# Patient Record
Sex: FEMALE | Race: OTHER | NOT HISPANIC OR LATINO
[De-identification: names, ages, dates, MRNs, and addresses within clinical notes are randomized per-mention and may not be internally consistent; named-entity substitution may affect disease eponyms.]

---

## 2017-03-14 ENCOUNTER — APPOINTMENT (OUTPATIENT)
Dept: OBGYN | Facility: CLINIC | Age: 37
End: 2017-03-14

## 2017-05-10 ENCOUNTER — APPOINTMENT (OUTPATIENT)
Dept: OBGYN | Facility: CLINIC | Age: 37
End: 2017-05-10

## 2017-12-06 ENCOUNTER — APPOINTMENT (OUTPATIENT)
Dept: OBGYN | Facility: CLINIC | Age: 37
End: 2017-12-06

## 2019-02-18 ENCOUNTER — RESULT REVIEW (OUTPATIENT)
Age: 39
End: 2019-02-18

## 2019-03-24 ENCOUNTER — OUTPATIENT (OUTPATIENT)
Dept: OUTPATIENT SERVICES | Facility: HOSPITAL | Age: 39
LOS: 1 days | End: 2019-03-24
Payer: MEDICAID

## 2019-03-24 DIAGNOSIS — Z3A.00 WEEKS OF GESTATION OF PREGNANCY NOT SPECIFIED: ICD-10-CM

## 2019-03-24 DIAGNOSIS — O26.899 OTHER SPECIFIED PREGNANCY RELATED CONDITIONS, UNSPECIFIED TRIMESTER: ICD-10-CM

## 2019-03-24 PROCEDURE — G0463: CPT

## 2019-03-24 PROCEDURE — 59025 FETAL NON-STRESS TEST: CPT | Mod: 26

## 2019-03-24 PROCEDURE — 59025 FETAL NON-STRESS TEST: CPT

## 2020-07-15 ENCOUNTER — RESULT REVIEW (OUTPATIENT)
Age: 40
End: 2020-07-15

## 2021-03-29 ENCOUNTER — INPATIENT (INPATIENT)
Facility: HOSPITAL | Age: 41
LOS: 0 days | Discharge: HOME | End: 2021-03-30
Attending: OBSTETRICS & GYNECOLOGY | Admitting: OBSTETRICS & GYNECOLOGY
Payer: MEDICAID

## 2021-03-29 ENCOUNTER — TRANSCRIPTION ENCOUNTER (OUTPATIENT)
Age: 41
End: 2021-03-29

## 2021-03-29 VITALS
SYSTOLIC BLOOD PRESSURE: 119 MMHG | RESPIRATION RATE: 17 BRPM | HEART RATE: 86 BPM | TEMPERATURE: 98 F | DIASTOLIC BLOOD PRESSURE: 76 MMHG

## 2021-03-29 DIAGNOSIS — Z3A.00 WEEKS OF GESTATION OF PREGNANCY NOT SPECIFIED: ICD-10-CM

## 2021-03-29 DIAGNOSIS — O26.899 OTHER SPECIFIED PREGNANCY RELATED CONDITIONS, UNSPECIFIED TRIMESTER: ICD-10-CM

## 2021-03-29 DIAGNOSIS — Z34.80 ENCOUNTER FOR SUPERVISION OF OTHER NORMAL PREGNANCY, UNSPECIFIED TRIMESTER: ICD-10-CM

## 2021-03-29 LAB
APTT BLD: 27.1 SEC — LOW (ref 27.5–35.5)
BASOPHILS # BLD AUTO: 0.02 K/UL — SIGNIFICANT CHANGE UP (ref 0–0.2)
BASOPHILS NFR BLD AUTO: 0.2 % — SIGNIFICANT CHANGE UP (ref 0–2)
BLD GP AB SCN SERPL QL: NEGATIVE — SIGNIFICANT CHANGE UP
COVID-19 SPIKE DOMAIN AB INTERP: POSITIVE
COVID-19 SPIKE DOMAIN ANTIBODY RESULT: 41.3 U/ML — HIGH
EOSINOPHIL # BLD AUTO: 0.11 K/UL — SIGNIFICANT CHANGE UP (ref 0–0.5)
EOSINOPHIL NFR BLD AUTO: 1.3 % — SIGNIFICANT CHANGE UP (ref 0–6)
FIBRINOGEN PPP-MCNC: 623 MG/DL — HIGH (ref 290–520)
HBV SURFACE AG SERPL QL IA: SIGNIFICANT CHANGE UP
HCT VFR BLD CALC: 40.8 % — SIGNIFICANT CHANGE UP (ref 34.5–45)
HGB BLD-MCNC: 13.8 G/DL — SIGNIFICANT CHANGE UP (ref 11.5–15.5)
IMM GRANULOCYTES NFR BLD AUTO: 0.5 % — SIGNIFICANT CHANGE UP (ref 0–1.5)
INR BLD: 0.91 RATIO — SIGNIFICANT CHANGE UP (ref 0.88–1.16)
LYMPHOCYTES # BLD AUTO: 2.2 K/UL — SIGNIFICANT CHANGE UP (ref 1–3.3)
LYMPHOCYTES # BLD AUTO: 26.9 % — SIGNIFICANT CHANGE UP (ref 13–44)
MCHC RBC-ENTMCNC: 29.2 PG — SIGNIFICANT CHANGE UP (ref 27–34)
MCHC RBC-ENTMCNC: 33.8 GM/DL — SIGNIFICANT CHANGE UP (ref 32–36)
MCV RBC AUTO: 86.3 FL — SIGNIFICANT CHANGE UP (ref 80–100)
MONOCYTES # BLD AUTO: 0.64 K/UL — SIGNIFICANT CHANGE UP (ref 0–0.9)
MONOCYTES NFR BLD AUTO: 7.8 % — SIGNIFICANT CHANGE UP (ref 2–14)
NEUTROPHILS # BLD AUTO: 5.17 K/UL — SIGNIFICANT CHANGE UP (ref 1.8–7.4)
NEUTROPHILS NFR BLD AUTO: 63.3 % — SIGNIFICANT CHANGE UP (ref 43–77)
NRBC # BLD: 0 /100 WBCS — SIGNIFICANT CHANGE UP (ref 0–0)
PLATELET # BLD AUTO: 159 K/UL — SIGNIFICANT CHANGE UP (ref 150–400)
PROTHROM AB SERPL-ACNC: 11 SEC — SIGNIFICANT CHANGE UP (ref 10.6–13.6)
RBC # BLD: 4.73 M/UL — SIGNIFICANT CHANGE UP (ref 3.8–5.2)
RBC # FLD: 12.6 % — SIGNIFICANT CHANGE UP (ref 10.3–14.5)
RH IG SCN BLD-IMP: POSITIVE — SIGNIFICANT CHANGE UP
RUBV IGG SER-ACNC: 16 INDEX — SIGNIFICANT CHANGE UP
RUBV IGG SER-IMP: POSITIVE — SIGNIFICANT CHANGE UP
SARS-COV-2 IGG+IGM SERPL QL IA: 41.3 U/ML — HIGH
SARS-COV-2 IGG+IGM SERPL QL IA: POSITIVE
SARS-COV-2 RNA SPEC QL NAA+PROBE: SIGNIFICANT CHANGE UP
T PALLIDUM AB TITR SER: NEGATIVE — SIGNIFICANT CHANGE UP
WBC # BLD: 8.18 K/UL — SIGNIFICANT CHANGE UP (ref 3.8–10.5)
WBC # FLD AUTO: 8.18 K/UL — SIGNIFICANT CHANGE UP (ref 3.8–10.5)

## 2021-03-29 PROCEDURE — 59409 OBSTETRICAL CARE: CPT | Mod: U7

## 2021-03-29 RX ORDER — AER TRAVELER 0.5 G/1
1 SOLUTION RECTAL; TOPICAL EVERY 4 HOURS
Refills: 0 | Status: DISCONTINUED | OUTPATIENT
Start: 2021-03-29 | End: 2021-03-30

## 2021-03-29 RX ORDER — OXYTOCIN 10 UNIT/ML
333.33 VIAL (ML) INJECTION
Qty: 20 | Refills: 0 | Status: DISCONTINUED | OUTPATIENT
Start: 2021-03-29 | End: 2021-03-30

## 2021-03-29 RX ORDER — LANOLIN
1 OINTMENT (GRAM) TOPICAL EVERY 6 HOURS
Refills: 0 | Status: DISCONTINUED | OUTPATIENT
Start: 2021-03-29 | End: 2021-03-30

## 2021-03-29 RX ORDER — SIMETHICONE 80 MG/1
80 TABLET, CHEWABLE ORAL EVERY 4 HOURS
Refills: 0 | Status: DISCONTINUED | OUTPATIENT
Start: 2021-03-29 | End: 2021-03-30

## 2021-03-29 RX ORDER — BENZOCAINE 10 %
1 GEL (GRAM) MUCOUS MEMBRANE EVERY 6 HOURS
Refills: 0 | Status: DISCONTINUED | OUTPATIENT
Start: 2021-03-29 | End: 2021-03-30

## 2021-03-29 RX ORDER — MAGNESIUM HYDROXIDE 400 MG/1
30 TABLET, CHEWABLE ORAL
Refills: 0 | Status: DISCONTINUED | OUTPATIENT
Start: 2021-03-29 | End: 2021-03-30

## 2021-03-29 RX ORDER — SODIUM CHLORIDE 9 MG/ML
3 INJECTION INTRAMUSCULAR; INTRAVENOUS; SUBCUTANEOUS EVERY 8 HOURS
Refills: 0 | Status: DISCONTINUED | OUTPATIENT
Start: 2021-03-29 | End: 2021-03-30

## 2021-03-29 RX ORDER — KETOROLAC TROMETHAMINE 30 MG/ML
30 SYRINGE (ML) INJECTION ONCE
Refills: 0 | Status: DISCONTINUED | OUTPATIENT
Start: 2021-03-29 | End: 2021-03-29

## 2021-03-29 RX ORDER — SODIUM CHLORIDE 9 MG/ML
1000 INJECTION, SOLUTION INTRAVENOUS
Refills: 0 | Status: DISCONTINUED | OUTPATIENT
Start: 2021-03-29 | End: 2021-03-29

## 2021-03-29 RX ORDER — DIPHENHYDRAMINE HCL 50 MG
25 CAPSULE ORAL EVERY 6 HOURS
Refills: 0 | Status: DISCONTINUED | OUTPATIENT
Start: 2021-03-29 | End: 2021-03-30

## 2021-03-29 RX ORDER — IBUPROFEN 200 MG
600 TABLET ORAL EVERY 6 HOURS
Refills: 0 | Status: COMPLETED | OUTPATIENT
Start: 2021-03-29 | End: 2022-02-25

## 2021-03-29 RX ORDER — TETANUS TOXOID, REDUCED DIPHTHERIA TOXOID AND ACELLULAR PERTUSSIS VACCINE, ADSORBED 5; 2.5; 8; 8; 2.5 [IU]/.5ML; [IU]/.5ML; UG/.5ML; UG/.5ML; UG/.5ML
0.5 SUSPENSION INTRAMUSCULAR ONCE
Refills: 0 | Status: DISCONTINUED | OUTPATIENT
Start: 2021-03-29 | End: 2021-03-30

## 2021-03-29 RX ORDER — DIBUCAINE 1 %
1 OINTMENT (GRAM) RECTAL EVERY 6 HOURS
Refills: 0 | Status: DISCONTINUED | OUTPATIENT
Start: 2021-03-29 | End: 2021-03-30

## 2021-03-29 RX ORDER — IBUPROFEN 200 MG
600 TABLET ORAL EVERY 6 HOURS
Refills: 0 | Status: DISCONTINUED | OUTPATIENT
Start: 2021-03-29 | End: 2021-03-30

## 2021-03-29 RX ORDER — ACETAMINOPHEN 500 MG
2 TABLET ORAL
Qty: 0 | Refills: 0 | DISCHARGE

## 2021-03-29 RX ORDER — ACETAMINOPHEN 500 MG
1000 TABLET ORAL ONCE
Refills: 0 | Status: COMPLETED | OUTPATIENT
Start: 2021-03-29 | End: 2021-03-29

## 2021-03-29 RX ORDER — IBUPROFEN 200 MG
1 TABLET ORAL
Qty: 0 | Refills: 0 | DISCHARGE

## 2021-03-29 RX ORDER — PRAMOXINE HYDROCHLORIDE 150 MG/15G
1 AEROSOL, FOAM RECTAL EVERY 4 HOURS
Refills: 0 | Status: DISCONTINUED | OUTPATIENT
Start: 2021-03-29 | End: 2021-03-30

## 2021-03-29 RX ORDER — OXYCODONE HYDROCHLORIDE 5 MG/1
5 TABLET ORAL ONCE
Refills: 0 | Status: DISCONTINUED | OUTPATIENT
Start: 2021-03-29 | End: 2021-03-30

## 2021-03-29 RX ORDER — ACETAMINOPHEN 500 MG
975 TABLET ORAL
Refills: 0 | Status: DISCONTINUED | OUTPATIENT
Start: 2021-03-29 | End: 2021-03-30

## 2021-03-29 RX ORDER — HYDROCORTISONE 1 %
1 OINTMENT (GRAM) TOPICAL EVERY 6 HOURS
Refills: 0 | Status: DISCONTINUED | OUTPATIENT
Start: 2021-03-29 | End: 2021-03-30

## 2021-03-29 RX ORDER — OXYCODONE HYDROCHLORIDE 5 MG/1
5 TABLET ORAL
Refills: 0 | Status: DISCONTINUED | OUTPATIENT
Start: 2021-03-29 | End: 2021-03-30

## 2021-03-29 RX ORDER — CITRIC ACID/SODIUM CITRATE 300-500 MG
15 SOLUTION, ORAL ORAL EVERY 6 HOURS
Refills: 0 | Status: DISCONTINUED | OUTPATIENT
Start: 2021-03-29 | End: 2021-03-29

## 2021-03-29 RX ADMIN — Medication 30 MILLIGRAM(S): at 06:50

## 2021-03-29 RX ADMIN — Medication 975 MILLIGRAM(S): at 22:25

## 2021-03-29 RX ADMIN — Medication 600 MILLIGRAM(S): at 11:54

## 2021-03-29 RX ADMIN — Medication 600 MILLIGRAM(S): at 12:34

## 2021-03-29 RX ADMIN — Medication 975 MILLIGRAM(S): at 21:42

## 2021-03-29 RX ADMIN — Medication 1 TABLET(S): at 11:54

## 2021-03-29 RX ADMIN — Medication 400 MILLIGRAM(S): at 06:41

## 2021-03-29 RX ADMIN — Medication 975 MILLIGRAM(S): at 16:05

## 2021-03-29 NOTE — OB PROVIDER H&P - NSHPPHYSICALEXAM_GEN_ALL_CORE
Vital Signs Last 24 Hours  T(C): --  HR: 69 (03-29-21 @ 04:51) (69 - 99)  BP: 108/66 (03-29-21 @ 04:51) (108/66 - 119/76)  RR: --  SpO2: 99% (03-29-21 @ 04:51) (80% - 100%)    Gen: appears extremely uncomfortable during contractions  SVE: 10/100/+1 with bulging bag  EFM: 130bpm, mod faith, -accel, -decel  Hooper: cx q4 min

## 2021-03-29 NOTE — OB PROVIDER H&P - HISTORY OF PRESENT ILLNESS
41 year old  at 37.0 weeks presents in active labor, GBS +.    ObHx: FT  x3 (largest baby 7+ lbs); SAB x2  MedHx:  Hx obtained via assistance from patient's  and [place]  #[id number]    41 year old  at 36.4 weeks presents in active labor, GBS +. EFW approximately 5.5#. Patient reports she has GDM (does not appear to take medications). Also reports that she takes Heparin injections daily for unclear history (possibly APLS?); states she has never had clots but took heparin in her last pregnancy as well. Denies asthma or HTN disorders.     Ob: [facility]  ObHx: FT  x3 (largest baby 7+ lbs); SAB x2  MedHx: Denies  PSH: Denies  Meds: HSQ (last took yesterday evening); ASA, PNV  All: NKDA  SocHx: Denies alcohol, tobacco, drug use  FamHx: Noncontributory

## 2021-03-29 NOTE — DISCHARGE NOTE OB - CARE PROVIDER_API CALL
[facility],   [address] [place] [address] [address], [place] [address] [phone]  Phone: (   )    -  Fax: (   )    -  Follow Up Time:

## 2021-03-29 NOTE — DISCHARGE NOTE OB - CARE PLAN
Principal Discharge DX:	Vaginal delivery  Goal:	Recovery  Assessment and plan of treatment:	Make a follow-up appointment with your doctor in SIX WEEKS for a postpartum appointment. No heavy lifting or strenuous activity for six weeks. Nothing in the vagina (such as tampons, douches, intercourse or tub baths) until you see your doctor. You can take 1000mg of Tylenol and/or 600mg of Motrin every 6 hours for pain. Call your doctor with any signs and symptoms of infection such as fever, chills, nausea or vomiting; if you're unable to tolerate food, have an increase in vaginal bleeding, or have difficulty urinating; or if you have pain that is not relieved by medication. Notify your doctor with any other concerns including feeling depressed or "down".

## 2021-03-29 NOTE — OB RN PATIENT PROFILE - BRADEN SCORE (IF 18 OR LESS ACTIVATE SKIN INJURY RISK INCREASED GUIDELINE), MLM
Message    Date / Time: MING CORRALES RN 3/27/17 1:00 P.M.   Patient Home Phone: [phone].     General Information   Last Visit: 1/24/17.    Next Visit: 4/5/17.   Reason for Call PATIENT LEFT MSG  REPORTS FEELING DEPRESSED AND REQUESTING MEDICATION ADJUSTMENT  STATES CURRENTLY TAKING WELLBUTRIN 300MG (see phone note of 2/7/17)  PLEASE ADVISE   Patient Update ODILIA LOPEZ MD 3/27/17 [id number]  Please utilize therapist as needed but follow up at next appointment. No change until then.    MING CORRALES RN 3/27/17 3:14 P.M.  LEFT MSG REGARDING DOC RECOMMENDATIONS        Signatures   Electronically signed by : ODILIA LOPEZ M.D.; Mar 27 2017  3:26PM CST (Author)     22

## 2021-03-29 NOTE — OB PROVIDER H&P - ATTENDING COMMENTS
Resident note reviewed and I agree with above. I was present for the history taking with use of the  phone.   Pt presented fully dilated with bulging membranes and no prenatal records available. Pt reports she had prenatal care with [facility]  Labs revealed + GBS however delivery was imminent. EDC 21  I was present for the delivery as well.    41 year old  at 36.4 weeks presents in active labor, GBS +. EFW approximately 5.5#. Patient reports she has GDM (does not appear to take medications). Also reports that she takes Heparin injections daily for unclear history (possibly APLS?); states she has never had clots but took heparin in her last pregnancy as well. Denies asthma or HTN disorders.     Ob: [facility]  ObHx: FT  x3 (largest baby 7+ lbs); SAB x2  MedHx: Denies  PSH: Denies  Meds: HSQ (last took yesterday evening); ASA, PNV  All: NKDA  SocHx: Denies alcohol, tobacco, drug use  FamHx: Noncontributory     40yo  at 36.4wks GA presenting fully dilated and had precipitous delivery.   -Patient admitted to L&D  -Consents signed prior to delivery with   -IVF  -Routine labs  -patient does not desire PP contraception or circumcision  -GBS+, however delivery imminent upon arrival  -PPH risk due to precipitous delivery and grandmultip  Joo Szymanski MD

## 2021-03-29 NOTE — OB PROVIDER H&P - ASSESSMENT
40yo  at 36.4wks GA presenting fully dilated and had precipitous delivery.   -Patient admitted to L&D  -Consents signed prior to delivery with   -IVF  -Routine labs  -patient does not desire PP contraception or circumcision  -GBS+, however delivery imminent upon arrival  -PPH risk due to precipitous delivery and grandmultip     Dr. Szymanski () present  W/ Dr. Rodriguez PGY4   RFrankel PGY2

## 2021-03-29 NOTE — OB PROVIDER DELIVERY SUMMARY - NSPROVIDERDELIVERYNOTE_OBGYN_ALL_OB_FT
Patient was fully dilated and pushing. Fetal head delivered via OA position . The anterior and posterior shoulders delivered, followed by the remaining body atraumatically. Delayed cord clamping was performed, and then clamped and cut. Cord blood gases collected. The  was handed to the mother and RN. The placenta delivered intact with membranes. Pitocin was administered  Uterus massaged, fundus found to be firm.   Cervix, vagina and perineum inspected. good hemostasis.         Laceration: none      Dr. present for the delivery Patient was fully dilated and pushing. Fetal head delivered via OA position . The anterior and posterior shoulders delivered, followed by the remaining body atraumatically. Delayed cord clamping was performed, and then clamped and cut. Cord blood gases collected. The  was handed to the mother and RN. The placenta delivered intact with membranes. Pitocin was administered  Uterus massaged, fundus found to be firm.   Cervix, vagina and perineum inspected. good hemostasis.         Laceration: none      Dr. Szymanski present for the delivery  Resident note reviewed and agree with above.   Joo Szymanski MD

## 2021-03-29 NOTE — DISCHARGE NOTE OB - HOSPITAL COURSE
Patient had an uncomplicated  followed by an uncomplicated postpartum course.    EBL: 200  Hct: 40.8    On Postpartum day 2, patient was discharged home in stable condition, voiding spontaneously and with normal vital signs. Patient had an uncomplicated  followed by an uncomplicated postpartum course.    EBL: 200  Hct: 40.8    On Postpartum day 1, patient was discharged home in stable condition, voiding spontaneously and with normal vital signs.

## 2021-03-29 NOTE — DISCHARGE NOTE OB - PLAN OF CARE
Recovery Make a follow-up appointment with your doctor in SIX WEEKS for a postpartum appointment. No heavy lifting or strenuous activity for six weeks. Nothing in the vagina (such as tampons, douches, intercourse or tub baths) until you see your doctor. You can take 1000mg of Tylenol and/or 600mg of Motrin every 6 hours for pain. Call your doctor with any signs and symptoms of infection such as fever, chills, nausea or vomiting; if you're unable to tolerate food, have an increase in vaginal bleeding, or have difficulty urinating; or if you have pain that is not relieved by medication. Notify your doctor with any other concerns including feeling depressed or "down".

## 2021-03-29 NOTE — DISCHARGE NOTE OB - PROVIDER TOKENS
FREE:[LAST:[Low risk clinic],PHONE:[(   )    -],FAX:[(   )    -],ADDRESS:[[address] [place] [address] [address], [place] [address] [phone]]]

## 2021-03-29 NOTE — DISCHARGE NOTE OB - PATIENT PORTAL LINK FT
You can access the FollowMyHealth Patient Portal offered by [facility] by registering at the following website: [email or web address]. By joining [facility]’s FollowMyHealth portal, you will also be able to view your health information using other applications (apps) compatible with our system.

## 2021-03-29 NOTE — DISCHARGE NOTE OB - MEDICATION SUMMARY - MEDICATIONS TO TAKE
I will START or STAY ON the medications listed below when I get home from the hospital:    Motrin 600 mg oral tablet  -- 1 tab(s) by mouth every 6 hours  -- Indication: For Vaginal Delivery    Tylenol 500 mg oral tablet  -- 2 tab(s) by mouth every 6 hours  -- Indication: For Vaginal Delivery

## 2021-03-29 NOTE — OB RN DELIVERY SUMMARY - NS_SEPSISRSKCALC_OBGYN_ALL_OB_FT
EOS calculated successfully. EOS Risk Factor: 0.5/1000 live births ([facility] national incidence); GA=36w4d; Temp=97.7; ROM=0.25; GBS='Positive'; Antibiotics='No antibiotics or any antibiotics < 2 hrs prior to birth'

## 2021-03-30 VITALS
SYSTOLIC BLOOD PRESSURE: 95 MMHG | RESPIRATION RATE: 17 BRPM | TEMPERATURE: 98 F | OXYGEN SATURATION: 99 % | DIASTOLIC BLOOD PRESSURE: 69 MMHG | HEART RATE: 76 BPM

## 2021-03-30 PROCEDURE — 86762 RUBELLA ANTIBODY: CPT

## 2021-03-30 PROCEDURE — 87340 HEPATITIS B SURFACE AG IA: CPT

## 2021-03-30 PROCEDURE — G0463: CPT

## 2021-03-30 PROCEDURE — 86780 TREPONEMA PALLIDUM: CPT

## 2021-03-30 PROCEDURE — 86900 BLOOD TYPING SEROLOGIC ABO: CPT

## 2021-03-30 PROCEDURE — 86769 SARS-COV-2 COVID-19 ANTIBODY: CPT

## 2021-03-30 PROCEDURE — 59025 FETAL NON-STRESS TEST: CPT

## 2021-03-30 PROCEDURE — 85610 PROTHROMBIN TIME: CPT

## 2021-03-30 PROCEDURE — 85025 COMPLETE CBC W/AUTO DIFF WBC: CPT

## 2021-03-30 PROCEDURE — 87635 SARS-COV-2 COVID-19 AMP PRB: CPT

## 2021-03-30 PROCEDURE — 59050 FETAL MONITOR W/REPORT: CPT

## 2021-03-30 PROCEDURE — 86901 BLOOD TYPING SEROLOGIC RH(D): CPT

## 2021-03-30 PROCEDURE — 86850 RBC ANTIBODY SCREEN: CPT

## 2021-03-30 PROCEDURE — 85384 FIBRINOGEN ACTIVITY: CPT

## 2021-03-30 PROCEDURE — 85730 THROMBOPLASTIN TIME PARTIAL: CPT

## 2021-03-30 RX ADMIN — Medication 600 MILLIGRAM(S): at 00:21

## 2021-03-30 RX ADMIN — Medication 975 MILLIGRAM(S): at 21:50

## 2021-03-30 RX ADMIN — Medication 975 MILLIGRAM(S): at 09:10

## 2021-03-30 RX ADMIN — Medication 600 MILLIGRAM(S): at 18:23

## 2021-03-30 RX ADMIN — Medication 600 MILLIGRAM(S): at 13:30

## 2021-03-30 RX ADMIN — Medication 975 MILLIGRAM(S): at 02:50

## 2021-03-30 RX ADMIN — Medication 975 MILLIGRAM(S): at 14:52

## 2021-03-30 RX ADMIN — Medication 600 MILLIGRAM(S): at 05:45

## 2021-03-30 RX ADMIN — Medication 975 MILLIGRAM(S): at 10:00

## 2021-03-30 RX ADMIN — Medication 975 MILLIGRAM(S): at 15:30

## 2021-03-30 RX ADMIN — Medication 600 MILLIGRAM(S): at 06:33

## 2021-03-30 RX ADMIN — Medication 600 MILLIGRAM(S): at 01:00

## 2021-03-30 RX ADMIN — Medication 975 MILLIGRAM(S): at 03:30

## 2021-03-30 RX ADMIN — Medication 975 MILLIGRAM(S): at 22:50

## 2021-03-30 RX ADMIN — Medication 600 MILLIGRAM(S): at 12:51

## 2021-03-30 RX ADMIN — Medication 1 TABLET(S): at 12:51

## 2021-03-30 NOTE — PROGRESS NOTE ADULT - ASSESSMENT
40y/o  PPD#1 from  in stable condition.     - Continue with po analgesia  - Increase ambulation  - Continue regular diet  - IV lock  - No labs  - Breast feeding  - Bonding well with baby   - Desires condoms for contraception postpartum     Ally Varner, PGY-1

## 2021-03-30 NOTE — PROGRESS NOTE ADULT - ATTENDING COMMENTS
Patient seen and examined by me.  Agree with above resident note. Patient states she will be going back to her private OB for the 6 week checkup and for any questions or issues up until that time. I offered her our OBGYN clinic and she refused our care after discharge. I let her know we are available to her if she changes her mind.

## 2021-03-30 NOTE — DIETITIAN INITIAL EVALUATION ADULT. - CHIEF COMPLAINT
Pt is a 41 year old  PPD#1 s/p , antepartum course significant for GDM. Pt is Evelia speaking, she accepted free  services,  [facility] ID#[id number]. This was pt's first GDM pregnancy. She plans on breast and formula feeding infant.

## 2021-03-30 NOTE — PROGRESS NOTE ADULT - SUBJECTIVE AND OBJECTIVE BOX
Patient is 40yo seen and examined at bedside, no acute overnight events.   No acute concerns, pain well controlled.   Patient is ambulating, voiding spontaneously, passing gas, and tolerating regular diet.  Lochia decreasing.  Denies CP, SOB, leg pain, N/V, HA, blurred vision, epigastric pain.    Vital Signs Last 24 Hours  T(C): 36.6 (03-30-21 @ 04:36), Max: 36.8 (03-29-21 @ 08:30)  HR: 68 (03-30-21 @ 04:36) (68 - 78)  BP: 93/55 (03-30-21 @ 04:36) (93/55 - 103/68)  RR: 18 (03-30-21 @ 04:36) (17 - 18)  SpO2: 96% (03-30-21 @ 04:36) (96% - 99%)    Physical Exam:  General: NAD  Abdomen: Soft, non-tender, non-distended, fundus firm  Pelvic: Lochia wnl  Ext: WWP, non-tender, symmetric, mild edema     Labs:  Blood type: O Positive  Rubella IgG: Positive (03-29 @ 10:24)  RPR: Negative                          13.8   8.18 >-----------< 159    ( 03-29 @ 04:25 )             40.8                      MEDICATIONS  (STANDING):  acetaminophen   Tablet .. 975 milliGRAM(s) Oral <User Schedule>  diphtheria/tetanus/pertussis (acellular) Vaccine (ADAcel) 0.5 milliLiter(s) IntraMuscular once  ibuprofen  Tablet. 600 milliGRAM(s) Oral every 6 hours  oxytocin Infusion 333.333 milliUNIT(s)/Min (1000 mL/Hr) IV Continuous <Continuous>  oxytocin Infusion 333.333 milliUNIT(s)/Min (1000 mL/Hr) IV Continuous <Continuous>  prenatal multivitamin 1 Tablet(s) Oral daily  sodium chloride 0.9% lock flush 3 milliLiter(s) IV Push every 8 hours    MEDICATIONS  (PRN):  benzocaine 20%/menthol 0.5% Spray 1 Spray(s) Topical every 6 hours PRN for Perineal discomfort  dibucaine 1% Ointment 1 Application(s) Topical every 6 hours PRN Perineal discomfort  diphenhydrAMINE 25 milliGRAM(s) Oral every 6 hours PRN Pruritus  hydrocortisone 1% Cream 1 Application(s) Topical every 6 hours PRN Moderate Pain (4-6)  lanolin Ointment 1 Application(s) Topical every 6 hours PRN nipple soreness  magnesium hydroxide Suspension 30 milliLiter(s) Oral two times a day PRN Constipation  oxyCODONE    IR 5 milliGRAM(s) Oral every 3 hours PRN Moderate to Severe Pain (4-10)  oxyCODONE    IR 5 milliGRAM(s) Oral once PRN Moderate to Severe Pain (4-10)  pramoxine 1%/zinc 5% Cream 1 Application(s) Topical every 4 hours PRN Moderate Pain (4-6)  simethicone 80 milliGRAM(s) Chew every 4 hours PRN Gas  witch hazel Pads 1 Application(s) Topical every 4 hours PRN Perineal discomfort

## 2021-03-30 NOTE — DIETITIAN INITIAL EVALUATION ADULT. - ADD RECOMMEND
Post-partum GDM diet education provided: 1) Increased risk of developing T2DM with GDM and ways to help prevent development. 2) 4-12 week fasting oral glucose tolerance test follow-up as outpatient 3) General healthful diet and physical activity recommendations discussed 4) Increased energy needs with breastfeeding.

## 2022-09-22 NOTE — OB PROVIDER H&P - LABOR: CERVICAL EFFACEMENT
Pt presents to ED in police custody for legal blood draw. Pt has no medical complaints.   Greater than 75%

## 2022-11-24 ENCOUNTER — EMERGENCY (EMERGENCY)
Facility: HOSPITAL | Age: 42
LOS: 1 days | Discharge: HOME | End: 2022-11-24
Attending: EMERGENCY MEDICINE
Payer: MEDICAID

## 2022-11-24 VITALS
WEIGHT: 111.99 LBS | OXYGEN SATURATION: 97 % | DIASTOLIC BLOOD PRESSURE: 70 MMHG | HEART RATE: 89 BPM | SYSTOLIC BLOOD PRESSURE: 99 MMHG | TEMPERATURE: 98 F | RESPIRATION RATE: 16 BRPM | HEIGHT: 59 IN

## 2022-11-24 PROBLEM — Z78.9 OTHER SPECIFIED HEALTH STATUS: Chronic | Status: ACTIVE | Noted: 2021-03-29

## 2022-11-24 LAB
RAPID RVP RESULT: SIGNIFICANT CHANGE UP
S PYO AG SPEC QL IA: NEGATIVE — SIGNIFICANT CHANGE UP
SARS-COV-2 RNA SPEC QL NAA+PROBE: SIGNIFICANT CHANGE UP

## 2022-11-24 PROCEDURE — 99283 EMERGENCY DEPT VISIT LOW MDM: CPT

## 2022-11-24 PROCEDURE — 87880 STREP A ASSAY W/OPTIC: CPT

## 2022-11-24 PROCEDURE — 99284 EMERGENCY DEPT VISIT MOD MDM: CPT

## 2022-11-24 PROCEDURE — 0225U NFCT DS DNA&RNA 21 SARSCOV2: CPT

## 2022-11-24 PROCEDURE — 87081 CULTURE SCREEN ONLY: CPT

## 2022-11-24 RX ORDER — DEXAMETHASONE 0.5 MG/5ML
6 ELIXIR ORAL ONCE
Refills: 0 | Status: COMPLETED | OUTPATIENT
Start: 2022-11-24 | End: 2022-11-24

## 2022-11-24 RX ORDER — IBUPROFEN 200 MG
600 TABLET ORAL ONCE
Refills: 0 | Status: COMPLETED | OUTPATIENT
Start: 2022-11-24 | End: 2022-11-24

## 2022-11-24 RX ORDER — LIDOCAINE 4 G/100G
1 CREAM TOPICAL ONCE
Refills: 0 | Status: COMPLETED | OUTPATIENT
Start: 2022-11-24 | End: 2022-11-24

## 2022-11-24 RX ORDER — ACETAMINOPHEN 500 MG
975 TABLET ORAL ONCE
Refills: 0 | Status: COMPLETED | OUTPATIENT
Start: 2022-11-24 | End: 2022-11-24

## 2022-11-24 RX ADMIN — Medication 975 MILLIGRAM(S): at 15:02

## 2022-11-24 RX ADMIN — Medication 600 MILLIGRAM(S): at 15:03

## 2022-11-24 RX ADMIN — LIDOCAINE 1 PATCH: 4 CREAM TOPICAL at 15:03

## 2022-11-24 RX ADMIN — Medication 6 MILLIGRAM(S): at 15:03
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
## 2022-11-24 NOTE — ED PROVIDER NOTE - PHYSICAL EXAMINATION
CONSTITUTIONAL: Well appearing and in no apparent distress.  ENT: Airway patent, moist mucous membranes. Uvula midline. Oropharynx erythematous. R tonsil enlarged with exudates noted. No bleeding. No trismus or drooling. Tolerating secretions. Normal sounding voice. No cervical lymphadenopathy.   EYES: Pupils equal, round and reactive to light. EOMI. Conjunctiva normal appearing.   CARDIAC: Normal rate, regular rhythm.  Heart sounds S1, S2.    RESPIRATORY: Breath sounds clear and equal bilaterally.   GASTROINTESTINAL: Abdomen soft, non-tender, not distended.  MUSCULOSKELETAL: Spine appears normal.  NEUROLOGICAL: Alert and oriented x3, no focal deficits, no motor or sensory deficits. 5/5 muscle strength throughout.  SKIN: Skin normal color, warm, dry and intact.   PSYCHIATRIC: Normal mood and affect.

## 2022-11-24 NOTE — ED PROVIDER NOTE - NSFOLLOWUPINSTRUCTIONS_ED_ALL_ED_FT
You were seen and evaluated in the ED for back pain and a sore throat.  Please make sure to follow up with your primary care doctor within 1-2 days. Bring a copy of all of your results with you to your follow up appointments.   Return to the ER as discussed if you develop any new or worsening symptoms.     You may take Tylenol 1000mg and Ibuprofen 400mg every 6 hours as needed for pain. Take Ibuprofen with food.   You may also use over the counter Lidocaine patches for your back pain.    Please STOP taking the Keflex that was prescribed to you previously  and START taking the new antibiotic, AUGMENTIN. Take as directed and complete the entire course.     Make sure to rest and hydrate.
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
## 2022-11-24 NOTE — ED ADULT NURSE NOTE - NS ED NURSE RECORD ANOTHER HT AND WT
Yes Pt expressed that he drinks alcohol when he is unable to sleep because it helps him go to bed. SW gave emotional support and encouraged pt to speak with his PCP about sleeping concerns; pt agreed.

## 2022-12-23 NOTE — OB RN PATIENT PROFILE - PATIENT ON (OXYGEN DELIVERY METHOD)
room air Vtama Pregnancy And Lactation Text: It is unknown if this medication can cause problems during pregnancy and breastfeeding.

## 2023-12-18 NOTE — DISCHARGE NOTE OB - AVOID PROLONGED STANDING
"Clement Flor" Eder was seen and treated in our emergency department on 12/18/2023.  He may return to school on 12/25/2023.  Okay to return to school once fever free for 24 hours without medication.     If you have any questions or concerns, please don't hesitate to call.      Salma Harrison MD" Statement Selected

## 2024-04-22 NOTE — ED ADULT TRIAGE NOTE - ARRIVAL FROM
Nick Bauer was seen and treated in our emergency department on 4/22/2024.                Diagnosis:     Nick  may return to work on return date.    He may return on this date: 04/23/2024    We administered IV narcotics to Mr Bauer while in the ER which may show up on future drug testing. He should wait until 8am when medications have fully worn off prior to driving     If you have any questions or concerns, please don't hesitate to call.      Sweta Hdez MD    ______________________________           _______________          _______________  Hospital Representative                              Date                                Time
Home

## 2024-05-08 NOTE — OB PROVIDER H&P - FAMILY HISTORY
No pertinent family history in first degree relatives
You can access the FollowMyHealth Patient Portal offered by [facility] by registering at the following website: [email or web address]. By joining [facility]’s FollowMyHealth portal, you will also be able to view your health information using other applications (apps) compatible with our system.

## 2024-06-12 NOTE — OB RN PATIENT PROFILE - PATIENT/CAREGIVER OFFERED  INTERPRETER SERVICES
[Unsteady Walking] : ataxia [Negative] : Respiratory [Headache] : no headache [Dizziness] : no dizziness [Fainting] : no fainting [Confusion] : no confusion [Memory Loss] : no memory loss [de-identified] : left sided weakness yes

## 2024-07-27 NOTE — DISCHARGE NOTE OB - DO YOU HAVE DIFFICULTY CLIMBING STAIRS
Repeat one touch result is 104 after snack. Patient encouraged to drink another can of ginger ale. She says she usually feels light headed and dizzy when her blood sugar drops.   No

## 2025-03-25 NOTE — OB RN DELIVERY SUMMARY - NS_FINALEDD_OBGYN_ALL_OB_DT
"Chief Complaint   Patient presents with    Surgical Followup     Review post-op imaging, DOS 2/27/25.       Vitals:    03/25/25 1231   BP: 128/73   BP Location: Left arm   Patient Position: Sitting   Cuff Size: Adult Regular   Pulse: 115   Temp: 98  F (36.7  C)   TempSrc: Oral   SpO2: 99%   Height: 5' 1\"       Body mass index is 32.06 kg/m .      Jaquan Sosa, EMT    "
22-Apr-2021